# Patient Record
Sex: FEMALE | Race: WHITE
[De-identification: names, ages, dates, MRNs, and addresses within clinical notes are randomized per-mention and may not be internally consistent; named-entity substitution may affect disease eponyms.]

---

## 2022-11-23 PROBLEM — Z00.00 ENCOUNTER FOR PREVENTIVE HEALTH EXAMINATION: Status: ACTIVE | Noted: 2022-11-23

## 2022-12-22 ENCOUNTER — NON-APPOINTMENT (OUTPATIENT)
Age: 42
End: 2022-12-22

## 2022-12-22 ENCOUNTER — APPOINTMENT (OUTPATIENT)
Dept: HEART AND VASCULAR | Facility: CLINIC | Age: 42
End: 2022-12-22

## 2022-12-22 VITALS
HEIGHT: 65 IN | WEIGHT: 175 LBS | BODY MASS INDEX: 29.16 KG/M2 | HEART RATE: 74 BPM | TEMPERATURE: 97.7 F | OXYGEN SATURATION: 96 % | DIASTOLIC BLOOD PRESSURE: 80 MMHG | SYSTOLIC BLOOD PRESSURE: 128 MMHG

## 2022-12-22 DIAGNOSIS — Z78.9 OTHER SPECIFIED HEALTH STATUS: ICD-10-CM

## 2022-12-22 DIAGNOSIS — Z86.69 PERSONAL HISTORY OF OTHER DISEASES OF THE NERVOUS SYSTEM AND SENSE ORGANS: ICD-10-CM

## 2022-12-22 PROCEDURE — 93000 ELECTROCARDIOGRAM COMPLETE: CPT

## 2022-12-22 PROCEDURE — 99204 OFFICE O/P NEW MOD 45 MIN: CPT | Mod: 25

## 2022-12-22 NOTE — PHYSICAL EXAM
[Well Developed] : well developed [Well Nourished] : well nourished [No Acute Distress] : no acute distress [Clear Lung Fields] : clear lung fields [Good Air Entry] : good air entry [No Respiratory Distress] : no respiratory distress  [Soft] : abdomen soft [Non Tender] : non-tender [No Masses/organomegaly] : no masses/organomegaly [No Edema] : no edema [Moves all extremities] : moves all extremities [No Focal Deficits] : no focal deficits [Normal Speech] : normal speech [Alert and Oriented] : alert and oriented [Normal memory] : normal memory

## 2022-12-25 NOTE — HISTORY OF PRESENT ILLNESS
[FreeTextEntry1] : 42 year old female, non smoker with PMHX of [place] Palsy (right sided, not back to baseline) no FMHX of CAD / CVA here for evaluation of dizziness. \par \par Patient is reporting episodes of dizziness reported as unsteady gait / off balanced 2-3 months averaging few minutes almost daily but feels followed by fatigue. This is not positional.  She occasionally noted increased heart rates during times of emotional stress for the past 1 year. She cannot recall last time.  She has no falls, syncope. She is reporting right sided weakness / tingling sensation of right side of body including face, RUE and RLE consistently for the past few months. \par \par She had gotten evaluated by neurologist with MRI w wo contrast, ENT and ophthalmologist with no significant findings. \par \par She occasionally feels pinches sensations over left sided of chest for the past few years. Can occur during rest or exertion. She does walk regular. She can walk up 20 stair steps without return of chest pain. She denies any dyspnea on exertion.\par \par I, Dr. Belia Phelan personally performed the evaluation and management services for this new patient who presents today with a new problem.  The evaluation and management includes conducting the examination assessing all conditions and establishing a new plan of care.  Today my ACP Chiquis Menendez was here and recorded the evaluation and management services. \par \par The patient also has left-sided chest discomfort.  Her EKG is abnormal.  She will return for stress test.  Other plan as above.

## 2022-12-25 NOTE — REVIEW OF SYSTEMS
[Palpitations] : palpitations [Heartburn] : heartburn [Fever] : no fever [Chills] : no chills [SOB] : no shortness of breath [Dyspnea on exertion] : not dyspnea during exertion [Chest Discomfort] : no chest discomfort [Lower Ext Edema] : no extremity edema [PND] : no PND [Nausea] : no nausea [Vomiting] : no vomiting [Diarrhea] : diarrhea [Dizziness] : no dizziness [Numbness (Hypoesthesia)] : no numbness [Weakness] : no weakness [Speech Disturbance] : no speech disturbance [Easy Bleeding] : no tendency for easy bleeding

## 2022-12-25 NOTE — DISCUSSION/SUMMARY
[FreeTextEntry1] : 42 year old female, non smoker with PMHX of [place] Palsy (right sided, not back to baseline ) no FMHX of CAD / CVA here for evaluation of dizziness. \par \par Dizziness: Almost daily, reported as unsteady. EKG SR 65 with non specific T wave inversions. Will send for 1 week holter, check echo for structure. \par BP: Controlled. \par HLD: . Work on diet and exercise. \par \par Return for echo. \par \par

## 2023-01-26 ENCOUNTER — APPOINTMENT (OUTPATIENT)
Dept: HEART AND VASCULAR | Facility: CLINIC | Age: 43
End: 2023-01-26
Payer: COMMERCIAL

## 2023-01-26 VITALS
DIASTOLIC BLOOD PRESSURE: 90 MMHG | HEART RATE: 77 BPM | WEIGHT: 185 LBS | SYSTOLIC BLOOD PRESSURE: 140 MMHG | BODY MASS INDEX: 30.82 KG/M2 | HEIGHT: 65 IN

## 2023-01-26 DIAGNOSIS — R07.89 OTHER CHEST PAIN: ICD-10-CM

## 2023-01-26 DIAGNOSIS — R06.00 DYSPNEA, UNSPECIFIED: ICD-10-CM

## 2023-01-26 DIAGNOSIS — R94.31 ABNORMAL ELECTROCARDIOGRAM [ECG] [EKG]: ICD-10-CM

## 2023-01-26 DIAGNOSIS — R42 DIZZINESS AND GIDDINESS: ICD-10-CM

## 2023-01-26 DIAGNOSIS — R53.1 WEAKNESS: ICD-10-CM

## 2023-01-26 DIAGNOSIS — G43.909 MIGRAINE, UNSPECIFIED, NOT INTRACTABLE, W/OUT STATUS MIGRAINOSUS: ICD-10-CM

## 2023-01-26 PROCEDURE — 93015 CV STRESS TEST SUPVJ I&R: CPT

## 2023-01-26 PROCEDURE — 99214 OFFICE O/P EST MOD 30 MIN: CPT | Mod: 25

## 2023-01-26 PROCEDURE — 93306 TTE W/DOPPLER COMPLETE: CPT

## 2023-01-26 NOTE — PHYSICAL EXAM
[Normal Conjunctiva] : normal conjunctiva [Normal Venous Pressure] : normal venous pressure [No Carotid Bruit] : no carotid bruit [Normal S1, S2] : normal S1, S2 [No Murmur] : no murmur [Normal Gait] : normal gait [No Edema] : no edema [Normal PT B/L] : normal PT B/L [Normal] : alert and oriented, normal memory

## 2023-01-27 PROBLEM — G43.909 MIGRAINE SYNDROME: Status: ACTIVE | Noted: 2023-01-27

## 2023-01-27 LAB
ANION GAP SERPL CALC-SCNC: 11 MMOL/L
BUN SERPL-MCNC: 12 MG/DL
CALCIUM SERPL-MCNC: 9.7 MG/DL
CHLORIDE SERPL-SCNC: 103 MMOL/L
CHOLEST SERPL-MCNC: 196 MG/DL
CO2 SERPL-SCNC: 25 MMOL/L
CREAT SERPL-MCNC: 0.64 MG/DL
EGFR: 112 ML/MIN/1.73M2
GLUCOSE SERPL-MCNC: 86 MG/DL
HDLC SERPL-MCNC: 71 MG/DL
LDLC SERPL CALC-MCNC: 113 MG/DL
NONHDLC SERPL-MCNC: 124 MG/DL
POTASSIUM SERPL-SCNC: 4.3 MMOL/L
SODIUM SERPL-SCNC: 140 MMOL/L
TRIGL SERPL-MCNC: 56 MG/DL

## 2023-01-27 NOTE — REASON FOR VISIT
[Symptom and Test Evaluation] : symptom and test evaluation [FreeTextEntry1] : Pt. is a 42 year old F with PMHx of [place] Palsy (dx 02/2011, right-sided, not back to baseline) who presents today for follow-up of dizziness, echocardiogram (bubble study) and stress EKG.

## 2023-01-27 NOTE — REVIEW OF SYSTEMS
[Dyspnea on exertion] : dyspnea during exertion [Chest Discomfort] : chest discomfort [Dizziness] : dizziness [Fever] : no fever [Chills] : no chills [SOB] : no shortness of breath [Lower Ext Edema] : no extremity edema [Leg Claudication] : no intermittent leg claudication [Palpitations] : no palpitations [Orthopnea] : no orthopnea [PND] : no PND [Syncope] : no syncope [Cough] : no cough [Abdominal Pain] : no abdominal pain

## 2023-01-27 NOTE — HISTORY OF PRESENT ILLNESS
[FreeTextEntry1] : Since her last visit, she continues to experience occasional lightheadedness, occurring intermittently for minutes at a time most mornings and mid afternoon after lunchtime. This has been occurring over the last 3+ months. Associated with a generalized body weakness. Not associated with position changes. \par \par She also reports numbness/tingling at the base of the right foot, right arm and hand. She feels this at least once/day over the last few months.\par \par She continues to experience an occasional "twinge" over the left chest, lasting seconds at a time. She feels this mostly at rest, never on exertion. At most she feels it once/week, however, she can go months without feeling anything. \par \par She denies any purposeful exercise. She walks 14 blocks daily to work with no exertional chest pain or shortness of breath. She does admit to some dyspnea on exertion when climbing stairs, not requiring her to stop and catch her breath.\par \par She does not monitor her BP at home but reports at her annual with her PCP her BP normally reads 120s systolic.\par \par PCP: Maritza Kan \par ===========================\par Labs/Diagnostics: \par 2022\par Lipid profile: TC: 193, T, HDL 63, LDL:118\par \par Holter (23): monitored for 7d, 321 PVCs, 63 PACs, 2 SVT events with fastest being 121 bpm

## 2023-01-27 NOTE — ASSESSMENT
[FreeTextEntry1] : Pt. is a 42 year old F with PMHx of [place] Palsy (dx 02/2011, right-sided, not back to baseline) who presents today for follow-up of dizziness, echocardiogram (bubble study) and stress EKG.\par \par Dizziness\par - Stable - no associated syncope \par - Recent Holter monitor (01/6/23 7 days) revealed a low PVC/PAC burden and 2 SVT events (fastest = 121 bpm and longest = 5 beats)\par - Echo with bubble study today r/o any structural or functional abnormalities - r/o PFO and CVA risk\par - Recent workup with Neurology/ENT/Opthalmology workup was negative (per pt)\par \par Chest discomfort / Dyspnea on exertion/ Abnormal EKG:\par - Chest discomfort is infrequent and atypical in nature \par - Dyspnea on exertion is limited to incline \par - EKG today: NSR @ 75 bpm with non-specific T wave inversion - unchanged from prior \par - Stress EKG today was normal - no ischemic EKG changes - low suspicion for signficant CAD\par \par Pt. to RTC in 6 months for follow-up.\par \par I have discussed the case with NP Marion Skinner. I have personally performed a history, physical exam, and my own medical decision making. I have reviewed the note and agree with the findings and plan.\par \par There is no PFO.  Her symptoms do not have a vascular etiology.

## 2024-08-04 PROBLEM — Z86.69 HISTORY OF BELL'S PALSY: Status: RESOLVED | Noted: 2022-12-22 | Resolved: 2024-08-04

## 2025-08-13 ENCOUNTER — NON-APPOINTMENT (OUTPATIENT)
Age: 45
End: 2025-08-13

## 2025-08-15 ENCOUNTER — APPOINTMENT (OUTPATIENT)
Dept: HEART AND VASCULAR | Facility: CLINIC | Age: 45
End: 2025-08-15
Payer: COMMERCIAL

## 2025-08-15 VITALS
HEART RATE: 75 BPM | DIASTOLIC BLOOD PRESSURE: 83 MMHG | WEIGHT: 180 LBS | SYSTOLIC BLOOD PRESSURE: 130 MMHG | BODY MASS INDEX: 29.99 KG/M2 | OXYGEN SATURATION: 97 % | HEIGHT: 65 IN | TEMPERATURE: 97.3 F

## 2025-08-15 DIAGNOSIS — R42 DIZZINESS AND GIDDINESS: ICD-10-CM

## 2025-08-15 DIAGNOSIS — R06.09 OTHER FORMS OF DYSPNEA: ICD-10-CM

## 2025-08-15 PROCEDURE — 93306 TTE W/DOPPLER COMPLETE: CPT

## 2025-08-15 PROCEDURE — 93000 ELECTROCARDIOGRAM COMPLETE: CPT

## 2025-08-15 PROCEDURE — 99214 OFFICE O/P EST MOD 30 MIN: CPT | Mod: 25
